# Patient Record
Sex: FEMALE | Race: OTHER | NOT HISPANIC OR LATINO
[De-identification: names, ages, dates, MRNs, and addresses within clinical notes are randomized per-mention and may not be internally consistent; named-entity substitution may affect disease eponyms.]

---

## 2017-01-12 PROBLEM — Z00.00 ENCOUNTER FOR PREVENTIVE HEALTH EXAMINATION: Status: ACTIVE | Noted: 2017-01-12

## 2017-04-19 ENCOUNTER — APPOINTMENT (OUTPATIENT)
Dept: RADIOLOGY | Facility: IMAGING CENTER | Age: 52
End: 2017-04-19

## 2024-03-05 ENCOUNTER — NON-APPOINTMENT (OUTPATIENT)
Age: 59
End: 2024-03-05

## 2024-03-05 ENCOUNTER — APPOINTMENT (OUTPATIENT)
Dept: NEUROSURGERY | Facility: CLINIC | Age: 59
End: 2024-03-05
Payer: COMMERCIAL

## 2024-03-05 VITALS
HEIGHT: 60 IN | SYSTOLIC BLOOD PRESSURE: 118 MMHG | BODY MASS INDEX: 28.27 KG/M2 | HEART RATE: 60 BPM | OXYGEN SATURATION: 97 % | WEIGHT: 144 LBS | DIASTOLIC BLOOD PRESSURE: 67 MMHG

## 2024-03-05 DIAGNOSIS — Z82.49 FAMILY HISTORY OF ISCHEMIC HEART DISEASE AND OTHER DISEASES OF THE CIRCULATORY SYSTEM: ICD-10-CM

## 2024-03-05 DIAGNOSIS — Z86.39 PERSONAL HISTORY OF OTHER ENDOCRINE, NUTRITIONAL AND METABOLIC DISEASE: ICD-10-CM

## 2024-03-05 DIAGNOSIS — Z78.9 OTHER SPECIFIED HEALTH STATUS: ICD-10-CM

## 2024-03-05 DIAGNOSIS — Z86.79 PERSONAL HISTORY OF OTHER DISEASES OF THE CIRCULATORY SYSTEM: ICD-10-CM

## 2024-03-05 DIAGNOSIS — Z95.0 PRESENCE OF CARDIAC PACEMAKER: ICD-10-CM

## 2024-03-05 DIAGNOSIS — Z86.73 PERSONAL HISTORY OF TRANSIENT ISCHEMIC ATTACK (TIA), AND CEREBRAL INFARCTION W/OUT RESIDUAL DEFICITS: ICD-10-CM

## 2024-03-05 PROCEDURE — 99205 OFFICE O/P NEW HI 60 MIN: CPT

## 2024-03-06 RX ORDER — AMLODIPINE BESYLATE 10 MG/1
10 TABLET ORAL
Refills: 0 | Status: ACTIVE | COMMUNITY

## 2024-03-06 RX ORDER — CLOPIDOGREL 75 MG/1
75 TABLET, FILM COATED ORAL
Refills: 0 | Status: ACTIVE | COMMUNITY

## 2024-03-06 RX ORDER — OLMESARTAN MEDOXOMIL 20 MG/1
20 TABLET, FILM COATED ORAL
Refills: 0 | Status: ACTIVE | COMMUNITY

## 2024-03-06 RX ORDER — ROSUVASTATIN CALCIUM 20 MG/1
20 TABLET, FILM COATED ORAL
Refills: 0 | Status: ACTIVE | COMMUNITY

## 2024-03-06 RX ORDER — ERTUGLIFLOZIN AND METFORMIN HYDROCHLORIDE 7.5; 1 MG/1; MG/1
7.5-1 TABLET, FILM COATED ORAL
Refills: 0 | Status: ACTIVE | COMMUNITY

## 2024-03-06 RX ORDER — ASPIRIN 81 MG
81 TABLET, DELAYED RELEASE (ENTERIC COATED) ORAL
Refills: 0 | Status: ACTIVE | COMMUNITY

## 2024-03-06 RX ORDER — SEMAGLUTIDE 1.34 MG/ML
INJECTION, SOLUTION SUBCUTANEOUS
Refills: 0 | Status: ACTIVE | COMMUNITY

## 2024-03-06 RX ORDER — CARVEDILOL 6.25 MG/1
6.25 TABLET, FILM COATED ORAL
Refills: 0 | Status: ACTIVE | COMMUNITY

## 2024-03-06 NOTE — REVIEW OF SYSTEMS
[Numbness] : numbness [Tingling] : tingling [As Noted in HPI] : as noted in HPI [Negative] : Cardiovascular [Confused or Disoriented] : no confusion [Facial Weakness] : no facial weakness [Hand Weakness] : no hand weakness [Arm Weakness] : no arm weakness [Leg Weakness] : no leg weakness [Seizures] : no convulsions [Difficulty Walking] : no difficulty walking [Cough] : no cough [Shortness Of Breath] : no shortness of breath [Abdominal Pain] : no abdominal pain [Vomiting] : no vomiting [Incontinence] : no incontinence [Dysuria] : no dysuria [Easy Bleeding] : no tendency for easy bleeding [Skin Lesions] : no skin lesions [Easy Bruising] : no tendency for easy bruising

## 2024-03-06 NOTE — HISTORY OF PRESENT ILLNESS
[< 3 months] : less than 3 months [FreeTextEntry1] : DREA f/u from [facility] [de-identified] : 57 yo female with PMH of admit to [facility] for numbness and tingling found on MRI to have multiple bilateral infarcts (see report).  She reports fluctuating pain, numbness, tingling, heavy feeling of all extremities on/off Left and Right.  Full strength, ambulates steady gait, no assistive devices. d/c home on ASA 81 mg 1 tab daily Plavix 75 mg 1 tab daily  rosuvastatin 20 mg 1 tab daily  [facility] imagin2024 MRI Brain w/wo - patient will get disc  Impression:  small 5mm acute infarct posterior left corona radiata. Punctate foci of recent infarction of anterior left corona radiata, cortex of the posterosuperior right parietal lobe and possibly within the cortex of the posterior Right temporal lobe.  No intracranial mass, mass-effect, or abnormal enhancement. Mild scattered foci of white matter T2 hyperintensity bilateral cerebebral hemispheres

## 2024-03-06 NOTE — PHYSICAL EXAM
[General Appearance - Alert] : alert [General Appearance - In No Acute Distress] : in no acute distress [Oriented To Time, Place, And Person] : oriented to person, place, and time [Cranial Nerves Optic (II)] : visual acuity intact bilaterally,  pupils equal round and reactive to light [Impaired Insight] : insight and judgment were intact [Cranial Nerves Trigeminal (V)] : facial sensation intact symmetrically [Cranial Nerves Oculomotor (III)] : extraocular motion intact [Cranial Nerves Facial (VII)] : face symmetrical [Cranial Nerves Glossopharyngeal (IX)] : tongue and palate midline [Cranial Nerves Vestibulocochlear (VIII)] : hearing was intact bilaterally [Cranial Nerves Accessory (XI - Cranial And Spinal)] : head turning and shoulder shrug symmetric [Cranial Nerves Hypoglossal (XII)] : there was no tongue deviation with protrusion [Sensation Tactile Decrease] : light touch was intact [Sclera] : the sclera and conjunctiva were normal [PERRL With Normal Accommodation] : pupils were equal in size, round, reactive to light, with normal accommodation [Hearing Threshold Finger Rub Not Kleberg] : hearing was normal [Neck Appearance] : the appearance of the neck was normal [] : no respiratory distress [Respiration, Rhythm And Depth] : normal respiratory rhythm and effort [Edema] : there was no peripheral edema [Abnormal Walk] : normal gait [Involuntary Movements] : no involuntary movements were seen [Motor Tone] : muscle strength and tone were normal [Skin Color & Pigmentation] : normal skin color and pigmentation [Limited Balance] : balance was intact [Tremor] : no tremor present

## 2024-03-06 NOTE — ASSESSMENT
[FreeTextEntry1] : 59 yo female with PPM with CTA ([facility]) and MRI ([place]) reporting multiple bilateral infarcts.  She reports fluctuating paresthesias and feeling sore with mild weakness in all extremities of both Left and Right on/off.  CT chest non con - negative for pathology LE dopplers - Negative for DVT (pt has PPM) MRI Brain w/wo - [facility] pt getting disc (we have report only) CTA/ CT head - [facility] - disc images loaded to PACS and reviewed.  PLAN:   Bring disc of [place] MRI Brain for review (obtained after initial [facility] CT since has PPM). has repeat hypercoagulable panel planned for 3 months (PCP/Cards) RTO 3 months for review of above

## 2024-03-06 NOTE — END OF VISIT
[Time Spent: ___ minutes] : I have spent [unfilled] minutes of time on the encounter. [FreeTextEntry3] :  I have seen and evaluated patient with NP Shi Salazar who has completed the documentation above.  Ms. Breaux was evaluated post-discharge from [facility] following a diagnosis of ischemic infarction. Remarkably, she exhibited a notable recovery with no focal neurological deficits, as indicated by an NIH Stroke Scale score of 0 and a Modified Benzie Scale score of 1. Reviewing available documentation, an MRI report highlighted bilateral acute ischemic infarcts in the left corona radiata, right parietal, and right posterior temporal regions.  Upon examination of a CT angiogram from [facility], which was available for review, there was no evidence of carotid stenosis, although there was noted burden of diffuse intracranial atherosclerosis without flow-limiting pathology. The patient's echocardiogram revealed no significant valvular disease, with an ejection fraction of 75%, and no evidence of right-to-left shunt, despite a history of hypertrophic cardiomyopathy and second-degree block with a pacemaker in situ.  Ms. Breaux presented her LDL and A1C levels from her phone, demonstrating adherence to current medications, specifically aspirin and clopidogrel as recommended for 21 days. Additionally, she disclosed undergoing hypercoagulable workup, which yielded positive results for lupus anticoagulant and Phosphatidylserine IgM. However, further evaluation by Hematology-Oncology deemed these findings nonsignificant at present, pending repeat blood work in 12 weeks. A chest CT scan revealed no underlying malignancy.  Given the bilateral nature of the strokes on MRI, the etiology appears embolic, with a potential consideration for an undetermined source at this juncture. However, the presence of specific markers, such as lupus anticoagulant and Phosphatidylserine IgM, suggests a hypercoagulable state leading to thromboembolism, warranting attention.  To mitigate the risk of recurrent stroke, Ms. Breaux will benefit from stringent secondary prevention measures, including maintaining blood pressure below 130/80 mmHg, achieving LDL levels below 70 mg/dL, tight glycemic control, and adherence to a regimen of aspirin 81 mg after discontinuation of clopidogrel at 21 days post-stroke. Additionally, continuation of a high-dose statin regimen, dietary modifications, and implementation of an exercise routine are recommended.  Further investigations are warranted, including prolonged cardiac monitoring to assess for cardiac arrhythmias predisposing to thromboembolism, with consideration for anticoagulant therapy adjustments. Re-evaluation by Hematology-Oncology regarding lupus anticoagulant positivity during acute stroke is advised, as alterations in blood work values or false positives may occur. Cardiology follow-up is essential to evaluate the impact of Ms. Breaux's cardiomyopathy on stroke risk and determine the appropriateness of anticoagulation therapy.

## 2024-03-06 NOTE — REASON FOR VISIT
[Consultation] : a consultation visit [FreeTextEntry1] : DREA from [facility] f/u

## 2024-04-25 ENCOUNTER — APPOINTMENT (OUTPATIENT)
Dept: OTOLARYNGOLOGY | Facility: CLINIC | Age: 59
End: 2024-04-25

## 2024-04-26 PROBLEM — Z86.39 HISTORY OF HIGH CHOLESTEROL: Status: RESOLVED | Noted: 2024-03-05 | Resolved: 2024-04-26

## 2024-04-26 PROBLEM — Z95.0 HISTORY OF CARDIAC PACEMAKER: Status: RESOLVED | Noted: 2024-03-05 | Resolved: 2024-04-26

## 2024-04-26 PROBLEM — Z82.49 FAMILY HISTORY OF MYOCARDIAL INFARCTION: Status: ACTIVE | Noted: 2024-03-05

## 2024-04-26 PROBLEM — Z86.79 HISTORY OF HYPERTENSION: Status: RESOLVED | Noted: 2024-03-05 | Resolved: 2024-04-26

## 2024-04-26 PROBLEM — Z86.73 HISTORY OF CEREBROVASCULAR ACCIDENT: Status: RESOLVED | Noted: 2024-03-05 | Resolved: 2024-04-26

## 2024-04-26 PROBLEM — Z78.9 NON-SMOKER: Status: ACTIVE | Noted: 2024-03-05

## 2024-04-26 PROBLEM — Z86.39 HISTORY OF DIABETES MELLITUS: Status: RESOLVED | Noted: 2024-03-05 | Resolved: 2024-04-26

## 2024-07-01 ENCOUNTER — NON-APPOINTMENT (OUTPATIENT)
Age: 59
End: 2024-07-01

## 2024-07-02 ENCOUNTER — APPOINTMENT (OUTPATIENT)
Dept: NEUROSURGERY | Facility: CLINIC | Age: 59
End: 2024-07-02
Payer: COMMERCIAL

## 2024-07-02 VITALS
HEIGHT: 60 IN | DIASTOLIC BLOOD PRESSURE: 63 MMHG | WEIGHT: 144 LBS | HEART RATE: 60 BPM | OXYGEN SATURATION: 97 % | SYSTOLIC BLOOD PRESSURE: 98 MMHG | BODY MASS INDEX: 28.27 KG/M2

## 2024-07-02 PROCEDURE — 99214 OFFICE O/P EST MOD 30 MIN: CPT

## 2024-07-02 RX ORDER — INSULIN GLARGINE 100 [IU]/ML
100 INJECTION, SOLUTION SUBCUTANEOUS
Refills: 0 | Status: ACTIVE | COMMUNITY